# Patient Record
Sex: MALE | Race: WHITE | Employment: UNEMPLOYED | URBAN - METROPOLITAN AREA
[De-identification: names, ages, dates, MRNs, and addresses within clinical notes are randomized per-mention and may not be internally consistent; named-entity substitution may affect disease eponyms.]

---

## 2018-04-23 ENCOUNTER — OFFICE VISIT (OUTPATIENT)
Dept: INTERNAL MEDICINE CLINIC | Age: 48
End: 2018-04-23

## 2018-04-23 ENCOUNTER — HOSPITAL ENCOUNTER (OUTPATIENT)
Dept: LAB | Age: 48
Discharge: HOME | End: 2018-04-23
Payer: OTHER GOVERNMENT

## 2018-04-23 VITALS
BODY MASS INDEX: 26.31 KG/M2 | HEIGHT: 70 IN | SYSTOLIC BLOOD PRESSURE: 139 MMHG | OXYGEN SATURATION: 100 % | DIASTOLIC BLOOD PRESSURE: 85 MMHG | RESPIRATION RATE: 12 BRPM | WEIGHT: 183.8 LBS | TEMPERATURE: 96.8 F | HEART RATE: 94 BPM

## 2018-04-23 DIAGNOSIS — Z13.220 SCREENING FOR HYPERLIPIDEMIA: ICD-10-CM

## 2018-04-23 DIAGNOSIS — N52.9 ERECTILE DYSFUNCTION, UNSPECIFIED ERECTILE DYSFUNCTION TYPE: ICD-10-CM

## 2018-04-23 DIAGNOSIS — N52.9 ERECTILE DYSFUNCTION, UNSPECIFIED ERECTILE DYSFUNCTION TYPE: Primary | ICD-10-CM

## 2018-04-23 LAB
ANION GAP SERPL CALC-SCNC: 9 MMOL/L (ref 3–18)
BUN SERPL-MCNC: 22 MG/DL (ref 7–18)
BUN/CREAT SERPL: 25 (ref 12–20)
CALCIUM SERPL-MCNC: 8.7 MG/DL (ref 8.5–10.1)
CHLORIDE SERPL-SCNC: 103 MMOL/L (ref 100–108)
CHOLEST SERPL-MCNC: 145 MG/DL
CO2 SERPL-SCNC: 29 MMOL/L (ref 21–32)
CREAT SERPL-MCNC: 0.89 MG/DL (ref 0.6–1.3)
GLUCOSE SERPL-MCNC: 72 MG/DL (ref 74–99)
HBA1C MFR BLD: 5.4 % (ref 4.2–5.6)
HDLC SERPL-MCNC: 66 MG/DL (ref 40–60)
HDLC SERPL: 2.2 {RATIO} (ref 0–5)
LDLC SERPL CALC-MCNC: 67.2 MG/DL (ref 0–100)
LIPID PROFILE,FLP: ABNORMAL
POTASSIUM SERPL-SCNC: 5.1 MMOL/L (ref 3.5–5.5)
SODIUM SERPL-SCNC: 141 MMOL/L (ref 136–145)
TRIGL SERPL-MCNC: 59 MG/DL (ref ?–150)
VLDLC SERPL CALC-MCNC: 11.8 MG/DL

## 2018-04-23 PROCEDURE — 80048 BASIC METABOLIC PNL TOTAL CA: CPT | Performed by: INTERNAL MEDICINE

## 2018-04-23 PROCEDURE — 80061 LIPID PANEL: CPT | Performed by: INTERNAL MEDICINE

## 2018-04-23 PROCEDURE — 83036 HEMOGLOBIN GLYCOSYLATED A1C: CPT | Performed by: INTERNAL MEDICINE

## 2018-04-23 PROCEDURE — 36415 COLL VENOUS BLD VENIPUNCTURE: CPT | Performed by: INTERNAL MEDICINE

## 2018-04-23 PROCEDURE — 84403 ASSAY OF TOTAL TESTOSTERONE: CPT | Performed by: INTERNAL MEDICINE

## 2018-04-23 RX ORDER — SILDENAFIL 100 MG/1
100 TABLET, FILM COATED ORAL AS NEEDED
Qty: 30 TAB | Refills: 11 | Status: SHIPPED | OUTPATIENT
Start: 2018-04-23

## 2018-04-23 RX ORDER — SILDENAFIL 100 MG/1
50-100 TABLET, FILM COATED ORAL AS NEEDED
COMMUNITY
End: 2018-04-23 | Stop reason: SDUPTHER

## 2018-04-23 NOTE — PROGRESS NOTES
Chief Complaint   Patient presents with   [address]    Erectile Dysfunction     for years and Viagra 100 mg medication he takes half and then tried 75 mg and then full strength is not working for the past 2 weeks     Patient was given a copy of the Advanced Medical Directive form and understands to bring it in once completed. 1. Have you been to the ER, urgent care clinic since your last visit? Hospitalized since your last visit? No    2. Have you seen or consulted any other health care providers outside of the [address] [place] [address] since your last visit? Include any pap smears or colon screening.  No

## 2018-04-23 NOTE — PATIENT INSTRUCTIONS
Body Mass Index: Care Instructions  Your Care Instructions    Body mass index (BMI) can help you see if your weight is raising your risk for health problems. It uses a formula to compare how much you weigh with how tall you are. · A BMI lower than 18.5 is considered underweight. · A BMI between 18.5 and 24.9 is considered healthy. · A BMI between 25 and 29.9 is considered overweight. A BMI of 30 or higher is considered obese. If your BMI is in the normal range, it means that you have a lower risk for weight-related health problems. If your BMI is in the overweight or obese range, you may be at increased risk for weight-related health problems, such as high blood pressure, heart disease, stroke, arthritis or joint pain, and diabetes. If your BMI is in the underweight range, you may be at increased risk for health problems such as fatigue, lower protection (immunity) against illness, muscle loss, bone loss, hair loss, and hormone problems. BMI is just one measure of your risk for weight-related health problems. You may be at higher risk for health problems if you are not active, you eat an unhealthy diet, or you drink too much alcohol or use tobacco products. Follow-up care is a key part of your treatment and safety. Be sure to make and go to all appointments, and call your doctor if you are having problems. It's also a good idea to know your test results and keep a list of the medicines you take. How can you care for yourself at home? · Practice healthy eating habits. This includes eating plenty of fruits, vegetables, whole grains, lean protein, and low-fat dairy. · If your doctor recommends it, get more exercise. Walking is a good choice. Bit by bit, increase the amount you walk every day. Try for at least 30 minutes on most days of the week. · Do not smoke. Smoking can increase your risk for health problems. If you need help quitting, talk to your doctor about stop-smoking programs and medicines. These can increase your chances of quitting for good. · Limit alcohol to 2 drinks a day for men and 1 drink a day for women. Too much alcohol can cause health problems. If you have a BMI higher than 25  · Your doctor may do other tests to check your risk for weight-related health problems. This may include measuring the distance around your waist. A waist measurement of more than 40 inches in men or 35 inches in women can increase the risk of weight-related health problems. · Talk with your doctor about steps you can take to stay healthy or improve your health. You may need to make lifestyle changes to lose weight and stay healthy, such as changing your diet and getting regular exercise. If you have a BMI lower than 18.5  · Your doctor may do other tests to check your risk for health problems. · Talk with your doctor about steps you can take to stay healthy or improve your health. You may need to make lifestyle changes to gain or maintain weight and stay healthy, such as getting more healthy foods in your diet and doing exercises to build muscle. Where can you learn more? Go to http://viridiana-celia.info/. Enter S176 in the search box to learn more about \"Body Mass Index: Care Instructions. \"  Current as of: October 13, 2016  Content Version: 11.4  © 5372-4253 Healthwise, Incorporated. Care instructions adapted under license by [employer] (which disclaims liability or warranty for this information). If you have questions about a medical condition or this instruction, always ask your healthcare professional. [place] [address] any warranty or liability for your use of this information. Patient was given a copy of the Advanced Medical Directive form and understands to bring it in once completed.   Health Maintenance Due   Topic Date Due    DTaP/Tdap/Td series (1 - Tdap) 01/04/1993    Influenza Age 5 to Adult  08/01/2017

## 2018-04-23 NOTE — PROGRESS NOTES
[facility]:  4/23/2018, MRN: [id number]      Debby Peterson is a 57 y.o. male and presents to clinic to Deonna Lopez and Erectile Dysfunction (for years and Viagra 100 mg medication he takes half and then tried 75 mg and then full strength is not working for the past 2 weeks)    Subjective: The pt is is a 55yo male with ED, sciatica (off/on), and PTSD. 1. Health Maintenance/General:  - ETOH use: None  - Tobacco use: Quit in 1996, smoked 5 yrs, 1/2 ppd  - Drug use: None  - Diet:Healthy  - Energy Drink Consumption: None  - Exercise: He exercises regularly. - He has not had blood work for 2 yrs  - Service in the [employer] for 26 yrs, serving in [place] and [place] on multiple tours    2. ED: Present since 2012. He took viagra off/on for sx. He gets night-time erections. Taking 50mg of viagra usually helped to relieve his sx. Recently he had to use 100mg viagra for a good effect but had difficulty with ejaculation. He is presently going through a divorce but met someone he is interested in as his divorce finalizes.        Patient Active Problem List    Diagnosis Date Noted    Erectile dysfunction 04/23/2018       No Known Allergies    Past Medical History:   Diagnosis Date    ED (erectile dysfunction) 2011    Elevated BP without diagnosis of hypertension     PTSD (post-traumatic stress disorder) 2017    Trauma        Past Surgical History:   Procedure Laterality Date    HX REFRACTIVE SURGERY  2006    bilateral    HX WISDOM TEETH EXTRACTION      x3       Family History   Problem Relation Age of Onset    Cancer Mother     No Known Problems Father     No Known Problems Sister     No Known Problems Brother     No Known Problems Maternal Grandmother     No Known Problems Maternal Grandfather     No Known Problems Son        Social History   Substance Use Topics    Smoking status: Former Smoker     Quit date: 12/31/1996    Smokeless tobacco: Former User     Types: Snuff     Quit date: 7/31/2016   Swapna Villareal Alcohol use No       ROS   Review of Systems   Constitutional: Negative for chills and fever. HENT: Negative for ear pain and sore throat. Eyes: Negative for blurred vision and pain. Respiratory: Negative for cough and shortness of breath. Cardiovascular: Negative for chest pain. Gastrointestinal: Negative for abdominal pain, blood in stool and melena. Genitourinary: Negative for dysuria, frequency and hematuria. Musculoskeletal: Negative for joint pain and myalgias. Skin: Negative for rash. Neurological: Negative for focal weakness and headaches. Endo/Heme/Allergies: Does not bruise/bleed easily. Psychiatric/Behavioral: Negative for substance abuse. Objective     Vitals:    04/23/18 0813   BP: 139/85   Pulse: 94   Resp: 12   Temp: 96.8 °F (36 °C)   TempSrc: Oral   SpO2: 100%   Weight: 183 lb 12.8 oz (83.4 kg)   Height: 5' 9.69\" (1.77 m)   PainSc:   0 - No pain       Physical Exam   Constitutional: He is oriented to person, place, and time and well-developed, well-nourished, and in no distress. HENT:   Head: Normocephalic and atraumatic. Right Ear: External ear normal.   Left Ear: External ear normal.   Nose: Nose normal.   Mouth/Throat: Oropharynx is clear and moist. No oropharyngeal exudate. Clear TMs   Eyes: Conjunctivae and EOM are normal. Pupils are equal, round, and reactive to light. Right eye exhibits no discharge. Left eye exhibits no discharge. No scleral icterus. Neck: Neck supple. Cardiovascular: Normal rate, regular rhythm, normal heart sounds and intact distal pulses. Pulmonary/Chest: Effort normal and breath sounds normal. No respiratory distress. He has no wheezes. He has no rales. Abdominal: Soft. Bowel sounds are normal. He exhibits no distension. There is no tenderness. There is no rebound and no guarding. Musculoskeletal: He exhibits no edema or tenderness (BUE). Lymphadenopathy:     He has no cervical adenopathy.    Neurological: He is alert and oriented to person, place, and time. He exhibits normal muscle tone. Gait normal.   Skin: Skin is warm and dry. No erythema. Psychiatric: Affect normal.   Nursing note and vitals reviewed. Assessment/Plan:   1. Health Maintenance:  - Screening for HLD with a lipid panel.  - Requesting his previous PCP records and vaccine records. ORDERS:  - LIPID PANEL; Future    2. Erectile dysfunction:   - Checking testosterone levels, and A1C, and a BMP  - Viagra refilled at 100mg strength prn  - Referral placed to Urology team for evaluation    ORDERS  - sildenafil citrate (VIAGRA) 100 mg tablet; Take 1 Tab by mouth as needed. Dispense: 30 Tab; Refill: 11  - TESTOSTERONE, FREE & TOTAL; Future  - REFERRAL TO UROLOGY  - HEMOGLOBIN A1C W/O EAG; Future  - METABOLIC PANEL, BASIC; Future        Health Maintenance Due   Topic Date Due    DTaP/Tdap/Td series (1 - Tdap) 01/04/1993    Influenza Age 5 to Adult  08/01/2017     Lab review: no lab studies available for review at time of visit    I have discussed the diagnosis with the patient and the intended plan as seen in the above orders. The patient has received an after-visit summary and questions were answered concerning future plans. I have discussed medication side effects and warnings with the patient as well. I have reviewed the plan of care with the patient, accepted their input and they are in agreement with the treatment goals. All questions were answered. The patient understands the plan of care. Handouts provided today with above information. Pt instructed if symptoms worsen to call the office or report to the ED for continued care. Greater than 50% of the visit time was spent in counseling and/or coordination of care. Follow-up Disposition:  Return in about 1 year (around 4/23/2019), or if symptoms worsen or fail to improve.     Consuelo Sarmiento MD

## 2018-04-23 NOTE — MR AVS SNAPSHOT
[address] [id number] [address] 
[phone] Patient: Brianne Lopez MRN: [id number] Visit Information Date & Time Provider Department Dept. Phone Encounter #  
 4/23/2018  8:00 AM Girish Doshi MD [facility] [id number] Follow-up Instructions Return in about 1 year (around 4/23/2019), or if symptoms worsen or fail to improve. Upcoming Health Maintenance Date Due DTaP/Tdap/Td series (1 - Tdap) 1/4/1993 Influenza Age 5 to Adult 8/1/2017 Allergies as of 4/23/2018  Review Complete On: 4/23/2018 By: Ryan Reynoso No Known Allergies Current Immunizations  Never Reviewed No immunizations on file. Not reviewed this visit You Were Diagnosed With   
  
 Codes Comments Erectile dysfunction, unspecified erectile dysfunction type    -  Primary ICD-10-CM: N52.9 ICD-9-CM: 607.84 Screening for hyperlipidemia     ICD-10-CM: Z13.220 ICD-9-CM: V77.91 Vitals BP Pulse Temp Resp Height(growth percentile) Weight(growth percentile) 139/85 (BP 1 Location: Left arm, BP Patient Position: Sitting) 94 96.8 °F (36 °C) (Oral) 12 5' 9.69\" (1.77 m) 183 lb 12.8 oz (83.4 kg) SpO2 BMI Smoking Status 100% 26.61 kg/m2 Former Smoker BMI and BSA Data Body Mass Index Body Surface Area  
 26.61 kg/m 2 2.02 m 2 Your Updated Medication List  
  
   
This list is accurate as of 4/23/18  8:50 AM.  Always use your most recent med list.  
  
  
  
  
 sildenafil citrate 100 mg tablet Commonly known as:  VIAGRA Take 1 Tab by mouth as needed. YOHIMBINE HCL (BULK) 5 mg by Does Not Apply route three (3) times daily. Prescriptions Printed Refills  
 sildenafil citrate (VIAGRA) 100 mg tablet 11 Sig: Take 1 Tab by mouth as needed. Class: Print Route: Oral  
  
We Performed the Following REFERRAL TO UROLOGY [ZTH679 Custom] Follow-up Instructions Return in about 1 year (around 4/23/2019), or if symptoms worsen or fail to improve. To-Do List   
 Around 04/23/2018 Lab:  HEMOGLOBIN A1C W/O EAG   
  
 04/23/2018 Lab:  LIPID PANEL Around 04/23/2018 Lab:  METABOLIC PANEL, BASIC   
  
 04/23/2018 Lab:  TESTOSTERONE, FREE & TOTAL Referral Information Referral ID Referred By Referred To  
  
 [id number] Fabricio Silence Not Available Visits Status Start Date End Date 1 New Request 4/23/18 4/23/19 If your referral has a status of pending review or denied, additional information will be sent to support the outcome of this decision. Patient Instructions Body Mass Index: Care Instructions Your Care Instructions Body mass index (BMI) can help you see if your weight is raising your risk for health problems. It uses a formula to compare how much you weigh with how tall you are. · A BMI lower than 18.5 is considered underweight. · A BMI between 18.5 and 24.9 is considered healthy. · A BMI between 25 and 29.9 is considered overweight. A BMI of 30 or higher is considered obese. If your BMI is in the normal range, it means that you have a lower risk for weight-related health problems. If your BMI is in the overweight or obese range, you may be at increased risk for weight-related health problems, such as high blood pressure, heart disease, stroke, arthritis or joint pain, and diabetes. If your BMI is in the underweight range, you may be at increased risk for health problems such as fatigue, lower protection (immunity) against illness, muscle loss, bone loss, hair loss, and hormone problems. BMI is just one measure of your risk for weight-related health problems. You may be at higher risk for health problems if you are not active, you eat an unhealthy diet, or you drink too much alcohol or use tobacco products. Follow-up care is a key part of your treatment and safety. Be sure to make and go to all appointments, and call your doctor if you are having problems. It's also a good idea to know your test results and keep a list of the medicines you take. How can you care for yourself at home? · Practice healthy eating habits. This includes eating plenty of fruits, vegetables, whole grains, lean protein, and low-fat dairy. · If your doctor recommends it, get more exercise. Walking is a good choice. Bit by bit, increase the amount you walk every day. Try for at least 30 minutes on most days of the week. · Do not smoke. Smoking can increase your risk for health problems. If you need help quitting, talk to your doctor about stop-smoking programs and medicines. These can increase your chances of quitting for good. · Limit alcohol to 2 drinks a day for men and 1 drink a day for women. Too much alcohol can cause health problems. If you have a BMI higher than 25 · Your doctor may do other tests to check your risk for weight-related health problems. This may include measuring the distance around your waist. A waist measurement of more than 40 inches in men or 35 inches in women can increase the risk of weight-related health problems. · Talk with your doctor about steps you can take to stay healthy or improve your health. You may need to make lifestyle changes to lose weight and stay healthy, such as changing your diet and getting regular exercise. If you have a BMI lower than 18.5 · Your doctor may do other tests to check your risk for health problems. · Talk with your doctor about steps you can take to stay healthy or improve your health. You may need to make lifestyle changes to gain or maintain weight and stay healthy, such as getting more healthy foods in your diet and doing exercises to build muscle. Where can you learn more? Go to http://viridiana-celia.info/. Enter S176 in the search box to learn more about \"Body Mass Index: Care Instructions. \" Current as of: October 13, 2016 Content Version: 11.4 © 6735-0555 [employer]. Care instructions adapted under license by [employer] (which disclaims liability or warranty for this information). If you have questions about a medical condition or this instruction, always ask your healthcare professional. [address] any warranty or liability for your use of this information. Patient was given a copy of the Advanced Medical Directive form and understands to bring it in once completed. Health Maintenance Due Topic Date Due  
 DTaP/Tdap/Td series (1 - Tdap) 01/04/1993  Influenza Age 5 to Adult  08/01/2017 Introducing [facility]! [facility] introduces [employer] patient portal. Now you can access parts of your medical record, email your doctor's office, and request medication refills online. 1. In your internet browser, go to [email or web address]. [email or web address] Online 2. Click on the First Time User? Click Here link in the Sign In box. You will see the New Member Sign Up page. 3. Enter your [employer] Access Code exactly as it appears below. You will not need to use this code after youve completed the sign-up process. If you do not sign up before the expiration date, you must request a new code. · [employer] Access Code: [email or web address] Expires: 7/22/2018  8:01 AM 
 
4. Enter the last four digits of your Social Security Number (xxxx) and Date of Birth (mm/dd/yyyy) as indicated and click Submit. You will be taken to the next sign-up page. 5. Create a [employer] ID. This will be your [employer] login ID and cannot be changed, so think of one that is secure and easy to remember. 6. Create a [employer] password. You can change your password at any time. 7. Enter your Password Reset Question and Answer. This can be used at a later time if you forget your password. 8. Enter your e-mail address. You will receive e-mail notification when new information is available in [address] 9. Click Sign Up. You can now view and download portions of your medical record. 10. Click the Download Summary menu link to download a portable copy of your medical information. If you have questions, please visit the Frequently Asked Questions section of the in2nite website. Remember, [employer]nite is NOT to be used for urgent needs. For medical emergencies, dial 911. Now available from your iPhone and Android! Please provide this summary of care documentation to your next provider. Your primary care clinician is listed as Omari Concepcion. If you have any questions after today's visit, please call [phone].

## 2018-04-24 ENCOUNTER — TELEPHONE (OUTPATIENT)
Dept: INTERNAL MEDICINE CLINIC | Age: 48
End: 2018-04-24

## 2018-04-24 LAB
TESTOST FREE SERPL-MCNC: 13.8 PG/ML (ref 6.8–21.5)
TESTOST SERPL-MCNC: 534 NG/DL (ref 264–916)

## 2018-04-24 NOTE — LETTER
4/25/2018 9:29 AM 
 
Mr. Tamia Heard [address] [address] [place] [address] Dear Mr. Tamia Heard, Good morning, I have attempted to reach you by phone twice, have not received a return call, so I am mailing this letter from Dr Belkis Suarez with the following information: 
 
 
----- Message from Lori Martin MD sent at 4/24/2018  1:02 PM EDT ----- Please let him know that his testosterone levels are normal. There is no evidence of diabetes/prediabetses. His cholesterol looks great! No medication is warranted to lower his cholesterol. Kidney function is normal. He needs to follow up with Urology for additional evaluation of ED symptoms I am also mailing your My Chart Activation Letter, Please go on line and set up your account before the expiration date. We can communicate with you via the "[employer] and you will be able to contact and communicate with us as well. Please call back with any questions and or concerns. Have a great day. Sincerely, Trudi George. Tray Ferguson for Dr Laura Krueger. Belkis Suarez

## 2018-04-24 NOTE — LETTER
April 25, 2018 Melchor Alvarez  [address]  [address] [place] [id number] Dear Allen Abreu: 
Thank you for requesting access to [employer]. Please follow the instructions below to securely access and download your online medical record. [employer] allows you to send messages to your doctor, view your test results, renew your prescriptions, schedule appointments, and more. How Do I Sign Up? 1. In your internet browser, go to [email or web address]  
2. Click on the First Time User? Click Here link in the Sign In box. You will be redirected to the New Member Sign Up page. 3. Enter your [employer] Access Code exactly as it appears below. You will not need to use this code after youve completed the sign-up process. If you do not sign up before the expiration date, you must request a new code. [employer] Access Code: [email or web address] Expires: 7/22/2018  8:01 AM  
 
4. Enter the last four digits of your Social Security Number (xxxx) and Date of Birth (mm/dd/yyyy) as indicated and click Submit. You will be taken to the next sign-up page. 5. Create a [employer] ID. This will be your [employer] login ID and cannot be changed, so think of one that is secure and easy to remember. 6. Create a [employer] password. You can change your password at any time. 7. Enter your Password Reset Question and Answer. This can be used at a later time if you forget your password. 8. Enter your e-mail address. You will receive e-mail notification when new information is available in [address] 9. Click Sign Up. You can now view and download portions of your medical record. 10. Click the Download Summary menu link to download a portable copy of your medical information. Additional Information If you have questions, please visit the Frequently Asked Questions section of the [employer] website at [email or web address]. [employer]. [email or web address] Remember, [employer] is NOT to be used for urgent needs. For medical emergencies, dial 911. Now available from your iPhone and Android! Sincerely, Adri Hebert

## 2018-04-24 NOTE — TELEPHONE ENCOUNTER
----- Message from Prosper Bella MD sent at 4/24/2018  1:02 PM EDT -----  Please let him know that his testosterone levels are normal. There is no evidence of diabetes/prediabetses. His cholesterol looks great! No medication is warranted to lower his cholesterol. Kidney function is normal. He needs to follow up with Urology for additional evaluation of ED symptoms.     Dr. Huntley  [facility] [address], [address] [place] [address] Str.  Phone: [phone]  Fax: [phone]

## 2018-04-24 NOTE — TELEPHONE ENCOUNTER
Chief Complaint   Patient presents with    Labs     per DR Leos [facility] done 4-23-18      Left voice message to return my call to discuss. Patient has not returned my calls, so I am mailing out today a Result Letter with Dr Eriberto Yancey directions. I am also mailing to the patient his My Chart Activation Letter for better communication with the patient.